# Patient Record
Sex: MALE | Race: WHITE | NOT HISPANIC OR LATINO | Employment: FULL TIME | URBAN - METROPOLITAN AREA
[De-identification: names, ages, dates, MRNs, and addresses within clinical notes are randomized per-mention and may not be internally consistent; named-entity substitution may affect disease eponyms.]

---

## 2018-03-04 ENCOUNTER — HOSPITAL ENCOUNTER (EMERGENCY)
Facility: MEDICAL CENTER | Age: 23
End: 2018-03-04
Attending: EMERGENCY MEDICINE

## 2018-03-04 ENCOUNTER — APPOINTMENT (OUTPATIENT)
Dept: RADIOLOGY | Facility: MEDICAL CENTER | Age: 23
End: 2018-03-04
Attending: EMERGENCY MEDICINE

## 2018-03-04 ENCOUNTER — HOSPITAL ENCOUNTER (EMERGENCY)
Facility: MEDICAL CENTER | Age: 23
End: 2018-03-04

## 2018-03-04 VITALS
WEIGHT: 160 LBS | SYSTOLIC BLOOD PRESSURE: 164 MMHG | BODY MASS INDEX: 23.7 KG/M2 | OXYGEN SATURATION: 97 % | HEIGHT: 69 IN | HEART RATE: 89 BPM | TEMPERATURE: 99.2 F | DIASTOLIC BLOOD PRESSURE: 96 MMHG | RESPIRATION RATE: 18 BRPM

## 2018-03-04 DIAGNOSIS — S09.90XA CLOSED HEAD INJURY, INITIAL ENCOUNTER: ICD-10-CM

## 2018-03-04 DIAGNOSIS — S01.01XA LACERATION OF SCALP, INITIAL ENCOUNTER: ICD-10-CM

## 2018-03-04 LAB
ABO GROUP BLD: NORMAL
ALBUMIN SERPL BCP-MCNC: 5 G/DL (ref 3.2–4.9)
ALBUMIN/GLOB SERPL: 1.7 G/DL
ALP SERPL-CCNC: 76 U/L (ref 30–99)
ALT SERPL-CCNC: 13 U/L (ref 2–50)
ANION GAP SERPL CALC-SCNC: 11 MMOL/L (ref 0–11.9)
APTT PPP: 25 SEC (ref 24.7–36)
AST SERPL-CCNC: 19 U/L (ref 12–45)
BILIRUB SERPL-MCNC: 0.6 MG/DL (ref 0.1–1.5)
BLD GP AB SCN SERPL QL: NORMAL
BUN SERPL-MCNC: 14 MG/DL (ref 8–22)
CALCIUM SERPL-MCNC: 10.3 MG/DL (ref 8.5–10.5)
CHLORIDE SERPL-SCNC: 106 MMOL/L (ref 96–112)
CO2 SERPL-SCNC: 24 MMOL/L (ref 20–33)
CREAT SERPL-MCNC: 0.92 MG/DL (ref 0.5–1.4)
ERYTHROCYTE [DISTWIDTH] IN BLOOD BY AUTOMATED COUNT: 39.9 FL (ref 35.9–50)
ETHANOL BLD-MCNC: 0.01 G/DL
GLOBULIN SER CALC-MCNC: 3 G/DL (ref 1.9–3.5)
GLUCOSE SERPL-MCNC: 106 MG/DL (ref 65–99)
HCT VFR BLD AUTO: 45.5 % (ref 42–52)
HGB BLD-MCNC: 15.2 G/DL (ref 14–18)
INR PPP: 1.05 (ref 0.87–1.13)
MCH RBC QN AUTO: 29.5 PG (ref 27–33)
MCHC RBC AUTO-ENTMCNC: 33.4 G/DL (ref 33.7–35.3)
MCV RBC AUTO: 88.3 FL (ref 81.4–97.8)
PLATELET # BLD AUTO: 275 K/UL (ref 164–446)
PMV BLD AUTO: 9.6 FL (ref 9–12.9)
POTASSIUM SERPL-SCNC: 3.9 MMOL/L (ref 3.6–5.5)
PROT SERPL-MCNC: 8 G/DL (ref 6–8.2)
PROTHROMBIN TIME: 13.4 SEC (ref 12–14.6)
RBC # BLD AUTO: 5.15 M/UL (ref 4.7–6.1)
RH BLD: NORMAL
SODIUM SERPL-SCNC: 141 MMOL/L (ref 135–145)
WBC # BLD AUTO: 13.2 K/UL (ref 4.8–10.8)

## 2018-03-04 PROCEDURE — 304999 HCHG REPAIR-SIMPLE/INTERMED LEVEL 1

## 2018-03-04 PROCEDURE — 86850 RBC ANTIBODY SCREEN: CPT

## 2018-03-04 PROCEDURE — 80053 COMPREHEN METABOLIC PANEL: CPT

## 2018-03-04 PROCEDURE — 86900 BLOOD TYPING SEROLOGIC ABO: CPT

## 2018-03-04 PROCEDURE — 304217 HCHG IRRIGATION SYSTEM

## 2018-03-04 PROCEDURE — 80307 DRUG TEST PRSMV CHEM ANLYZR: CPT

## 2018-03-04 PROCEDURE — 36415 COLL VENOUS BLD VENIPUNCTURE: CPT

## 2018-03-04 PROCEDURE — 85730 THROMBOPLASTIN TIME PARTIAL: CPT

## 2018-03-04 PROCEDURE — 86901 BLOOD TYPING SEROLOGIC RH(D): CPT

## 2018-03-04 PROCEDURE — 85027 COMPLETE CBC AUTOMATED: CPT

## 2018-03-04 PROCEDURE — 305948 HCHG GREEN TRAUMA ACT PRE-NOTIFY NO CC

## 2018-03-04 PROCEDURE — 303747 HCHG EXTRA SUTURE

## 2018-03-04 PROCEDURE — 72125 CT NECK SPINE W/O DYE: CPT

## 2018-03-04 PROCEDURE — 700101 HCHG RX REV CODE 250: Performed by: EMERGENCY MEDICINE

## 2018-03-04 PROCEDURE — 99284 EMERGENCY DEPT VISIT MOD MDM: CPT

## 2018-03-04 PROCEDURE — 70450 CT HEAD/BRAIN W/O DYE: CPT

## 2018-03-04 PROCEDURE — 85610 PROTHROMBIN TIME: CPT

## 2018-03-04 RX ORDER — LIDOCAINE HYDROCHLORIDE AND EPINEPHRINE BITARTRATE 20; .01 MG/ML; MG/ML
10 INJECTION, SOLUTION SUBCUTANEOUS ONCE
Status: COMPLETED | OUTPATIENT
Start: 2018-03-04 | End: 2018-03-04

## 2018-03-04 RX ADMIN — LIDOCAINE HYDROCHLORIDE AND EPINEPHRINE 10 ML: 20; 10 INJECTION, SOLUTION INFILTRATION; PERINEURAL at 16:30

## 2018-03-04 NOTE — ED NOTES
BIB EMS from [facility].  Pt was snowboarding & hit a tree.  No helmet, +LOC, +repetitive questioning on arrival.  Pt has 3cm lac to left temporal/parietal area.  GCS 15 on arrival.

## 2018-03-04 NOTE — ED PROVIDER NOTES
"ED Provider Note    CHIEF COMPLAINT  Head injury    HPI  Juliet Elizabeth(Titi Velazco) is a 21-year-old male who was snowboarding when he fell and struck a tree with positive loss of consciousness for an unknown duration.  The patient actually made his way down to the first aid station where the patient was seen by paramedics.  Patient has had some mild repetitive questioning and transport.  Upon arrival here the patient complains of a headache only.  He is amnestic to the events.  He denies: Neck pain, back pain, rib pain, difficulty breathing, abdominal pain, extremity pain, motor weakness, paresthesias.  No recent illness.  No other acute symptomatology or complaints.    REVIEW OF SYSTEMS  See HPI for further details.  He denies major health problems such as: Cardsiopulmonary disorders, gastrointestinal disorders, seizures, diabetes.  Review of systems otherwise negative.     PAST MEDICAL HISTORY  None    FAMILY HISTORY  No family history on file.    SOCIAL HISTORY  Resides in [facility]    SURGICAL HISTORY  No past surgical history on file.    CURRENT MEDICATIONS  None    ALLERGIES  Allergies not on file    PHYSICAL EXAM  VITAL SIGNS: /84   Pulse 102   Temp 37.3 °C (99.2 °F)   Resp 22   Ht 1.753 m (5' 9\")   Wt 72.6 kg (160 lb)   SpO2 95%   BMI 23.63 kg/m²    Constitutional: 22-year-old male, awake, oriented ×3, GCS 15   HENT: Calvarium: 3 cm laceration over the left temporoparietal scalp area, No Garibay's sign, No racoon sign, No hemotypanum, No midface trauma, No intraoral trauma, No malocclusion;  Eyes: PERRL, EOMI,   Neck: In line immobilization was maintained, No tenderness over the spinous processes, no step-offs; Trachea: midline; No JVD;   Cardiovascular: Normal heart rate, Normal rhythm, No murmurs, No rubs, No gallops.   Thorax & Lungs: Non tender to palpation, No palpable deformities or palpable rib fractures, No subcutaneous emphysema;Equal breath sounds, Lungs are clear to " auscultation,No respiratory distress.   Abdomen: Soft, Nontender without guarding, rebound or rigidity; No left or right upper quadrant tenderness; Bowel sounds normal in quality;  Skin: Warm, Dry, No erythema, No rash.   Back: No palpable deformities; No localized tenderness over the spinous processes;   Extremities: Intact distal pulses, No edema, No tenderness, No cyanosis, No clubbing.   Musculoskeletal: No palpable deformity of her lower extremities with full range of motion without discomfort;  Neurologic: Alert & oriented x 3, [place] Coma Score: 15; Normal motor function, Normal sensory function, No focal deficits noted.     RADIOLOGY/PROCEDURES  CT-CSPINE WITHOUT PLUS RECONS   Final Result      1.  Straightening of the cervical spine.   2.  No acute fracture or subluxation.   3.  Atypical normal variant limbus vertebral body versus old trauma at the C6 level.      CT-HEAD W/O   Final Result      1.  No acute intracranial abnormality.   2.  LEFT parietal scalp injury.            COURSE & MEDICAL DECISION MAKING  Pertinent Labs & Imaging studies reviewed. (See chart for details)  1.  Monitor  2.  Saline lock    Laboratory studies: CBC shows white count 13.2, hemoglobin 15.2, crit 45.5; coags are normal; CMP shows a glucose 106 otherwise within normal limits; diagnostic: 0.01;    Procedure note: The patient's scalp laceration was prepped and draped in sterile fashion.  The skin was anesthetized with 2% lidocaine with epinephrine.  The wound is irrigated with normal saline.  The hair was trimmed away from the wound margins.  The laceration was closed with 3-0 Ethilon.  5 interrupted sutures were placed.  The wound is cleansed and dressed.  No complications.    Discussion: At this time, the patient presents for evaluation of head injury.  CT scanning is unremarkable.  Patient has a GCS of 15 initially and repeat exam is also normal with a GCS 15.  In addition, there is no evidence of spine injury, intrathoracic  injury, intra-abdominal injury that require further imaging studies or evaluation and treatment.  The Patient had no deterioration in his neurological hemodynamic status.  At this time, the patient meets criteria for discharge.  I have discussed the findings and treatment plan with the patient and his friends were at the bedside who will be driving the patient home.    FINAL IMPRESSION  1. Closed head injury, initial encounter    2. Laceration of scalp, initial encounter        PLAN  1.  Appropriate discharge instructions given  2.  Stitches out in 10 days    Electronically signed by: Guy G Gansert, 3/4/2018

## 2018-03-05 NOTE — DISCHARGE INSTRUCTIONS
Concussion, Adult  A concussion is a brain injury. It is caused by:  · A hit to the head.  · A quick and sudden movement (jolt) of the head or neck.  A concussion is usually not life threatening. Even so, it can cause serious problems. If you had a concussion before, you may have concussion-like problems after a hit to your head.  Follow these instructions at home:  General instructions  · Follow your doctor's directions carefully.  · Take medicines only as told by your doctor.  · Only take medicines your doctor says are safe.  · Do not drink alcohol until your doctor says it is okay. Alcohol and some drugs can slow down healing. They can also put you at risk for further injury.  · If you are having trouble remembering things, write them down.  · Try to do one thing at a time if you get distracted easily. For example, do not watch TV while making dinner.  · Talk to your family members or close friends when making important decisions.  · Follow up with your doctor as told.  · Watch your symptoms. Tell others to do the same. Serious problems can sometimes happen after a concussion. Older adults are more likely to have these problems.  · Tell your teachers, school nurse, school counselor, , , or  about your concussion. Tell them about what you can or cannot do. They should watch to see if:  ¨ It gets even harder for you to pay attention or concentrate.  ¨ It gets even harder for you to remember things or learn new things.  ¨ You need more time than normal to finish things.  ¨ You become annoyed (irritable) more than before.  ¨ You are not able to deal with stress as well.  ¨ You have more problems than before.  · Rest. Make sure you:  ¨ Get plenty of sleep at night.  ¨ Go to sleep early.  ¨ Go to bed at the same time every day. Try to wake up at the same time.  ¨ Rest during the day.  ¨ Take naps when you feel tired.  · Limit activities where you have to think a lot or concentrate.  These include:  ¨ Doing homework.  ¨ Doing work related to a job.  ¨ Watching TV.  ¨ Using the computer.  Returning To Your Regular Activities   Return to your normal activities slowly, not all at once. You must give your body and brain enough time to heal.  · Do not play sports or do other athletic activities until your doctor says it is okay.  · Ask your doctor when you can drive, ride a bicycle, or work other vehicles or machines. Never do these things if you feel dizzy.  · Ask your doctor about when you can return to work or school.  Preventing Another Concussion   It is very important to avoid another brain injury, especially before you have healed. In rare cases, another injury can lead to permanent brain damage, brain swelling, or death. The risk of this is greatest during the first 7-10 days after your injury. Avoid injuries by:  · Wearing a seat belt when riding in a car.  · Not drinking too much alcohol.  · Avoiding activities that could lead to a second concussion (such as contact sports).  · Wearing a helmet when doing activities like:  ¨ Biking.  ¨ Skiing.  ¨ Skateboarding.  ¨ Skating.  · Making your home safer by:  ¨ Removing things from the floor or stairways that could make you trip.  ¨ Using grab bars in bathrooms and handrails by stairs.  ¨ Placing non-slip mats on floors and in bathtubs.  ¨ Improve lighting in dark areas.  Contact a doctor if:  · It gets even harder for you to pay attention or concentrate.  · It gets even harder for you to remember things or learn new things.  · You need more time than normal to finish things.  · You become annoyed (irritable) more than before.  · You are not able to deal with stress as well.  · You have more problems than before.  · You have problems keeping your balance.  · You are not able to react quickly when you should.  Get help if you have any of these problems for more than 2 weeks:  · Lasting (chronic) headaches.  · Dizziness or trouble  balancing.  · Feeling sick to your stomach (nausea).  · Seeing (vision) problems.  · Being affected by noises or light more than normal.  · Feeling sad, low, down in the dumps, blue, gloomy, or empty (depressed).  · Mood changes (mood swings).  · Feeling of fear or nervousness about what may happen (anxiety).  · Feeling annoyed.  · Memory problems.  · Problems concentrating or paying attention.  · Sleep problems.  · Feeling tired all the time.  Get help right away if:  · You have bad headaches or your headaches get worse.  · You have weakness (even if it is in one hand, leg, or part of the face).  · You have loss of feeling (numbness).  · You feel off balance.  · You keep throwing up (vomiting).  · You feel tired.  · One black center of your eye (pupil) is larger than the other.  · You twitch or shake violently (convulse).  · Your speech is not clear (slurred).  · You are more confused, easily angered (agitated), or annoyed than before.  · You have more trouble resting than before.  · You are unable to recognize people or places.  · You have neck pain.  · It is difficult to wake you up.  · You have unusual behavior changes.  · You pass out (lose consciousness).  This information is not intended to replace advice given to you by your health care provider. Make sure you discuss any questions you have with your health care provider.  Document Released: 12/06/2010 Document Revised: 05/25/2017 Document Reviewed: 07/10/2014  [employer] Patient Education © 2017 [employer]  Laceration Care, Adult  A laceration is a cut that goes through all of the layers of the skin and into the tissue that is right under the skin. Some lacerations heal on their own. Others need to be closed with stitches (sutures), staples, skin adhesive strips, or skin glue. Proper laceration care minimizes the risk of infection and helps the laceration to heal better.  HOW TO CARE FOR YOUR LACERATION  If sutures or staples were used:  · Keep  the wound clean and dry.  · If you were given a bandage (dressing), you should change it at least one time per day or as told by your health care provider. You should also change it if it becomes wet or dirty.  · Keep the wound completely dry for the first 24 hours or as told by your health care provider. After that time, you may shower or bathe. However, make sure that the wound is not soaked in water until after the sutures or staples have been removed.  · Clean the wound one time each day or as told by your health care provider:  ¨ Wash the wound with soap and water.  ¨ Rinse the wound with water to remove all soap.  ¨ Pat the wound dry with a clean towel. Do not rub the wound.  · After cleaning the wound, apply a thin layer of antibiotic ointment as told by your health care provider. This will help to prevent infection and keep the dressing from sticking to the wound.  · Have the sutures or staples removed as told by your health care provider.  If skin adhesive strips were used:  · Keep the wound clean and dry.  · If you were given a bandage (dressing), you should change it at least one time per day or as told by your health care provider. You should also change it if it becomes dirty or wet.  · Do not get the skin adhesive strips wet. You may shower or bathe, but be careful to keep the wound dry.  · If the wound gets wet, pat it dry with a clean towel. Do not rub the wound.  · Skin adhesive strips fall off on their own. You may trim the strips as the wound heals. Do not remove skin adhesive strips that are still stuck to the wound. They will fall off in time.  If skin glue was used:  · Try to keep the wound dry, but you may briefly wet it in the shower or bath. Do not soak the wound in water, such as by swimming.  · After you have showered or bathed, gently pat the wound dry with a clean towel. Do not rub the wound.  · Do not do any activities that will make you sweat heavily until the skin glue has fallen off  on its own.  · Do not apply liquid, cream, or ointment medicine to the wound while the skin glue is in place. Using those may loosen the film before the wound has healed.  · If you were given a bandage (dressing), you should change it at least one time per day or as told by your health care provider. You should also change it if it becomes dirty or wet.  · If a dressing is placed over the wound, be careful not to apply tape directly over the skin glue. Doing that may cause the glue to be pulled off before the wound has healed.  · Do not pick at the glue. The skin glue usually remains in place for 5-10 days, then it falls off of the skin.  General Instructions  · Take over-the-counter and prescription medicines only as told by your health care provider.  · If you were prescribed an antibiotic medicine or ointment, take or apply it as told by your doctor. Do not stop using it even if your condition improves.  · To help prevent scarring, make sure to cover your wound with sunscreen whenever you are outside after stitches are removed, after adhesive strips are removed, or when glue remains in place and the wound is healed. Make sure to wear a sunscreen of at least 30 SPF.  · Do not scratch or pick at the wound.  · Keep all follow-up visits as told by your health care provider. This is important.  · Check your wound every day for signs of infection. Watch for:  ¨ Redness, swelling, or pain.  ¨ Fluid, blood, or pus.  · Raise (elevate) the injured area above the level of your heart while you are sitting or lying down, if possible.  SEEK MEDICAL CARE IF:  · You received a tetanus shot and you have swelling, severe pain, redness, or bleeding at the injection site.  · You have a fever.  · A wound that was closed breaks open.  · You notice a bad smell coming from your wound or your dressing.  · You notice something coming out of the wound, such as wood or glass.  · Your pain is not controlled with medicine.  · You have  increased redness, swelling, or pain at the site of your wound.  · You have fluid, blood, or pus coming from your wound.  · You notice a change in the color of your skin near your wound.  · You need to change the dressing frequently due to fluid, blood, or pus draining from the wound.  · You develop a new rash.  · You develop numbness around the wound.  SEEK IMMEDIATE MEDICAL CARE IF:  · You develop severe swelling around the wound.  · Your pain suddenly increases and is severe.  · You develop painful lumps near the wound or on skin that is anywhere on your body.  · You have a red streak going away from your wound.  · The wound is on your hand or foot and you cannot properly move a finger or toe.  · The wound is on your hand or foot and you notice that your fingers or toes look pale or bluish.     This information is not intended to replace advice given to you by your health care provider. Make sure you discuss any questions you have with your health care provider.     Document Released: 12/18/2006 Document Revised: 05/03/2016 Document Reviewed: 12/14/2015  [employer] Interactive Patient Education ©2016 [employer]